# Patient Record
Sex: MALE | Race: WHITE | URBAN - METROPOLITAN AREA
[De-identification: names, ages, dates, MRNs, and addresses within clinical notes are randomized per-mention and may not be internally consistent; named-entity substitution may affect disease eponyms.]

---

## 2018-08-21 ENCOUNTER — OFFICE VISIT (OUTPATIENT)
Dept: URBAN - METROPOLITAN AREA CLINIC 45 | Facility: CLINIC | Age: 83
End: 2018-08-21
Payer: MEDICARE

## 2018-08-21 PROCEDURE — 99213 OFFICE O/P EST LOW 20 MIN: CPT | Performed by: OPTOMETRIST

## 2018-08-21 RX ORDER — BRIMONIDINE TARTRATE 2 MG/ML
0.2 % SOLUTION/ DROPS OPHTHALMIC
Qty: 1 | Refills: 6 | Status: INACTIVE
Start: 2018-08-21 | End: 2019-01-01

## 2018-08-21 RX ORDER — LATANOPROST 50 UG/ML
0.005 % SOLUTION OPHTHALMIC
Qty: 1 | Refills: 6 | Status: INACTIVE
Start: 2018-08-21 | End: 2019-01-01

## 2018-08-21 ASSESSMENT — INTRAOCULAR PRESSURE
OD: 16
OS: 18

## 2018-08-21 NOTE — IMPRESSION/PLAN
Impression: Primary open-angle glaucoma, bilateral, moderate stage: Y51.9501. Plan: Continue with Brimonidine tid ou and latanoprost qhs ou.

## 2018-10-31 ENCOUNTER — OFFICE VISIT (OUTPATIENT)
Dept: URBAN - METROPOLITAN AREA CLINIC 45 | Facility: CLINIC | Age: 83
End: 2018-10-31
Payer: MEDICARE

## 2018-10-31 DIAGNOSIS — H10.022 OTHER MUCOPURULENT CONJUNCTIVITIS, LEFT EYE: ICD-10-CM

## 2018-10-31 DIAGNOSIS — S05.00XA CORNEAL ABRASION WITHOUT FB OF EYE, INITIAL ENCOUNTER: Primary | ICD-10-CM

## 2018-10-31 PROCEDURE — 92012 INTRM OPH EXAM EST PATIENT: CPT | Performed by: OPTOMETRIST

## 2018-10-31 RX ORDER — OFLOXACIN 3 MG/ML
0.3 % SOLUTION/ DROPS OPHTHALMIC
Qty: 1 | Refills: 0 | Status: INACTIVE
Start: 2018-10-31 | End: 2018-11-29

## 2018-10-31 ASSESSMENT — INTRAOCULAR PRESSURE
OS: 17
OD: 17

## 2018-10-31 NOTE — IMPRESSION/PLAN
Impression: Primary open-angle glaucoma, bilateral, moderate stage: I42.3849.  Plan: pt to continue with Latanoprost qhs ou brimonidine tid os

## 2018-10-31 NOTE — IMPRESSION/PLAN
Impression: Other mucopurulent conjunctivitis, left eye: H10.022. Plan: pt to start Ofloxacin 1 gtt qid os. Educated patient on risks of non-compliance, side effects, benefits and anticipated results, pt understands.

## 2018-10-31 NOTE — IMPRESSION/PLAN
Impression: Corneal abrasion without FB of eye, initial encounter: S05.00xA. Plan: pt to use af tears q 2-3 hrs while awake.

## 2018-11-14 ENCOUNTER — OFFICE VISIT (OUTPATIENT)
Dept: URBAN - METROPOLITAN AREA CLINIC 45 | Facility: CLINIC | Age: 83
End: 2018-11-14
Payer: MEDICARE

## 2018-11-14 DIAGNOSIS — S05.02XS CORNEAL ABRASION WITHOUT FB OF LEFT EYE, SEQUELA: ICD-10-CM

## 2018-11-14 PROCEDURE — 99213 OFFICE O/P EST LOW 20 MIN: CPT | Performed by: OPTOMETRIST

## 2018-11-14 ASSESSMENT — INTRAOCULAR PRESSURE
OD: 16
OS: 16

## 2018-11-14 NOTE — IMPRESSION/PLAN
Impression: Primary open-angle glaucoma, bilateral, moderate stage: K09.7040. Plan: pt to continue with Brimonidine bid os and Latanoprost qhs ou.

## 2019-01-01 ENCOUNTER — OFFICE VISIT (OUTPATIENT)
Dept: URBAN - METROPOLITAN AREA CLINIC 45 | Facility: CLINIC | Age: 84
End: 2019-01-01
Payer: MEDICARE

## 2019-01-01 DIAGNOSIS — H02.055 TRICHIASIS WITHOUT ENTROPION LEFT LOWER EYELID: ICD-10-CM

## 2019-01-01 DIAGNOSIS — H02.054 TRICHIASIS WITHOUT ENTROPION LEFT UPPER EYELID: ICD-10-CM

## 2019-01-01 DIAGNOSIS — H35.3132 NEXDTVE AGE-RELATED MCLR DEGN, BILATERAL, INTERMED DRY STAGE: ICD-10-CM

## 2019-01-01 DIAGNOSIS — H01.8 OTHER SPECIFIED INFLAMMATION OF EYELID: Primary | ICD-10-CM

## 2019-01-01 DIAGNOSIS — H40.1132 PRIMARY OPEN-ANGLE GLAUCOMA, BILATERAL, MODERATE STAGE: Primary | ICD-10-CM

## 2019-01-01 DIAGNOSIS — H02.105 ECTROPION OF LEFT LOWER EYELID: ICD-10-CM

## 2019-01-01 DIAGNOSIS — H02.102 ECTROPION OF RIGHT LOWER EYELID: Primary | ICD-10-CM

## 2019-01-01 DIAGNOSIS — H01.00B BLEPHARITIS OF LT EYE, UPPER AND LOWER EYELIDS: ICD-10-CM

## 2019-01-01 DIAGNOSIS — H01.00A BLEPHARITIS OF RIGHT EYE, UPPER AND LOWER EYELIDS: ICD-10-CM

## 2019-01-01 DIAGNOSIS — H02.031 SENILE ENTROPION OF RIGHT UPPER EYELID: ICD-10-CM

## 2019-01-01 DIAGNOSIS — H04.123 DRY EYE SYNDROME OF BILATERAL LACRIMAL GLANDS: ICD-10-CM

## 2019-01-01 PROCEDURE — 99214 OFFICE O/P EST MOD 30 MIN: CPT | Performed by: OPHTHALMOLOGY

## 2019-01-01 PROCEDURE — 99213 OFFICE O/P EST LOW 20 MIN: CPT | Performed by: OPTOMETRIST

## 2019-01-01 PROCEDURE — 67820 REVISE EYELASHES: CPT | Performed by: OPTOMETRIST

## 2019-01-01 PROCEDURE — 99213 OFFICE O/P EST LOW 20 MIN: CPT | Performed by: OPHTHALMOLOGY

## 2019-01-01 PROCEDURE — 92014 COMPRE OPH EXAM EST PT 1/>: CPT | Performed by: OPTOMETRIST

## 2019-01-01 RX ORDER — CALCITRIOL 0.5 UG/1
CAPSULE, LIQUID FILLED ORAL
Qty: 0 | Refills: 0 | Status: ACTIVE
Start: 2019-01-01

## 2019-01-01 RX ORDER — FINASTERIDE 5 MG/1
5 MG TABLET, FILM COATED ORAL
Refills: 0 | Status: ACTIVE
Start: 2019-01-01

## 2019-01-01 RX ORDER — GENTAMICIN SULFATE 3 MG/G
0.3 % OINTMENT OPHTHALMIC
Qty: 1 | Refills: 0 | Status: INACTIVE
Start: 2019-01-01 | End: 2019-01-01

## 2019-01-01 ASSESSMENT — INTRAOCULAR PRESSURE
OS: 17
OS: 17
OS: 23
OS: 19
OS: 21
OS: 21
OD: 20
OS: 17
OD: 18
OD: 20
OS: 24
OD: 19
OD: 19
OD: 17
OD: 18
OD: 18

## 2019-03-14 NOTE — IMPRESSION/PLAN
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  Plan: [place]

## 2019-03-14 NOTE — IMPRESSION/PLAN
Impression: Primary open-angle glaucoma, bilateral, moderate stage: M03.6760.  Plan: brimonidine tid os and latanoprost qhs ou

## 2019-04-16 NOTE — IMPRESSION/PLAN
Impression: Primary open-angle glaucoma, bilateral, moderate stage: B78.9524.  Plan: LATANOPROST Q HS OU, BRIMONIDNE TID OS

## 2019-04-29 NOTE — IMPRESSION/PLAN
Impression: Trichiasis without entropion left upper eyelid: H02.054. Plan: epilated, recommend pt to see Dr Ryann Tapia for consult.

## 2019-05-06 NOTE — IMPRESSION/PLAN
Impression: Trichiasis without entropion left upper eyelid: H02.054. Plan: Consult recommended [[facility] Surgeon].

## 2019-05-10 NOTE — IMPRESSION/PLAN
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 Plan: patient asymptomatic will revaluate if patient becomes symptomatic.

## 2019-05-10 NOTE — IMPRESSION/PLAN
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 Plan: Discussed diagnosis in detail with patient. Discussed treatment options with patient. No trichiasis seen upon today's exam, patient just had manual epilation on Monday. Recommend no further epilation when eyelashes return will revaluate. If patient becomes symptomatic he is to call in and bee seen by Dr[facility] until next visit with myself.

## 2019-07-15 NOTE — IMPRESSION/PLAN
Impression: Primary open-angle glaucoma, bilateral, moderate stage: Z62.9465.  Plan: latanoprost q hs ou, brimonidine bid ou

## 2019-08-02 NOTE — IMPRESSION/PLAN
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  Plan: epilated, continue with af tears for comfort

## 2019-08-02 NOTE — IMPRESSION/PLAN
Impression: Primary open-angle glaucoma, bilateral, moderate stage: W72.9834.  Plan: COntinue with Brimonidine tid os and Latanoprost qhs ou

## 2019-08-12 NOTE — IMPRESSION/PLAN
Impression: Primary open-angle glaucoma, bilateral, moderate stage: R08.7577.  Plan: continue with gtts

## 2019-10-25 NOTE — IMPRESSION/PLAN
Impression: Primary open-angle glaucoma, bilateral, moderate stage: K95.0941.  Plan: Patient to continue brimonidine tid os, latanoprost qhs ou

## 2019-10-31 NOTE — IMPRESSION/PLAN
Impression: Blepharitis of right eye, upper and lower eyelids: H01.00A. Plan: Discussed diagnosis in detail with patient. Advised patient of condition. Lid scrubs and hygiene were explained. Patient instructed to apply warm compresses. Consult recommended [[facility]Plastic Surgeon].

## 2019-10-31 NOTE — IMPRESSION/PLAN
Impression: Ectropion of right lower eyelid: H02.102. Plan: Discussed diagnosis in detail with patient. Advised patient of condition. Patient instructed to use artificial tears as needed. Consult recommended [OcuPlastic Surgeon].